# Patient Record
Sex: FEMALE | Race: WHITE
[De-identification: names, ages, dates, MRNs, and addresses within clinical notes are randomized per-mention and may not be internally consistent; named-entity substitution may affect disease eponyms.]

---

## 2018-01-23 ENCOUNTER — HOSPITAL ENCOUNTER (EMERGENCY)
Dept: HOSPITAL 80 - CED | Age: 83
Discharge: HOME | End: 2018-01-23
Payer: COMMERCIAL

## 2018-01-23 VITALS — SYSTOLIC BLOOD PRESSURE: 122 MMHG | HEART RATE: 84 BPM | OXYGEN SATURATION: 96 % | DIASTOLIC BLOOD PRESSURE: 72 MMHG

## 2018-01-23 VITALS — TEMPERATURE: 98.1 F | RESPIRATION RATE: 16 BRPM

## 2018-01-23 DIAGNOSIS — S52.531A: Primary | ICD-10-CM
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
## 2018-01-23 NOTE — EDPHY
H & P


Time Seen by Provider: 01/23/18 09:37


HPI/ROS: 





This patient slipped on ice to at her son's home-sidewalk last night landing on 

outstretched right hand with injury to her right wrist with immediate severe 

pain to the distal radius.  She was watching her grandchildren's at that time 

and applied ice, took ibuprofen and applied a Velcro splint that she had from a 

prior wrist sprain if years ago.  After ibuprofen the pain diminishes to 

moderate intensity.  She had another dose of ibuprofen this morning prior to 

arrival in currently has moderate pain is worse with movement of the wrist.  No 

other exacerbating or alleviating factors.





ROS:  Neuro:  No numbness tingling or other complaints.  She did not strike her 

head and does not have a headache.


Musculoskeletal:  No neck back or other extremity injuries.


Integumentary:  No lacerations or abrasions.


5 point ROS is otherwise negative.





Past Medical/Surgical History: 





Otherwise healthy


Smoking Status: Never smoked


Physical Exam: 





Physical Exam


Vital signs are normal.


General:  No acute distress


HEENT:  Atraumatic.  


Eyes:  Pupils equal and react to light.  Extraocular motions are intact.


Lungs:  No respiratory distress.


Cardiac:  Brisk capillary refill is intact throughout.  Pulses are 2+ and 

symmetric in the affected extremity.


Skin:  No rash or pallor.


Extremities-atraumatic normal except for right wrist


Right wrist:  Patient has swelling and tenderness to the distal radius with 

associated ecchymosis and the step deformity to the dorsum of the radius.  No 

ulnar styloid tenderness.


Neuro:  Alert with no sensorimotor deficits in the affected extremity.





Initial differential diagnosis:  Distal radius fracture, scaphoid fracture, 

wrist sprain, traumatic hematoma


Constitutional: 


 Initial Vital Signs











Temperature (C)  36.7 C   01/23/18 09:41


 


Heart Rate  79   01/23/18 09:41


 


Respiratory Rate  16   01/23/18 09:41


 


Blood Pressure  128/76 H  01/23/18 09:41


 


O2 Sat (%)  94   01/23/18 09:41








 











O2 Delivery Mode               Room Air














Allergies/Adverse Reactions: 


 





No Known Allergies Allergy (Unverified 07/06/12 22:08)


 








Home Medications: 














 Medication  Instructions  Recorded


 


Fluticasone/Salmeterol [Advair 1 each IH 07/06/12





250-50 Diskus]  














MDM/Departure





- MDM


Imaging Results: 


 Imaging Impressions





Wrist X-Ray  01/23/18 09:41


Impression: 


1. Acute minimally impacted and angulated intra-articular distal radius 

fracture.


2. Scapholunate ligament laxity versus tear.


 











Imaging: I viewed and interpreted images myself


ED Course/Re-evaluation: 





We placed a call to Dr. Grier's office, the patient's orthopedic physician who 

treated her prior orthopedic injury.





I discussed the patient's radiographic findings with her-Colles fracture mildly 

impacted with intra-articular component.





SPLINT: Patient is placed in Orthoglass sugar-tong splint by our tech with my 

supervision and she is neurovascularly intact post splint application.





I discussed the case with Dr. Grier, her orthopedic physician who will see her 

in his office tomorrow for further evaluation  tx.





Patient is provided with a disc copy of her wrist radiographs bring to her 

orthopedic physician





Discussion:  Patient with mildly angulated and impacted Colles fracture with 

intra-articular component possible scapholunate ligament injury, 

neurovascularly intact. No evidence of other significant injuries.  She will 

again follow up with Dr. carver understands need to return emergency department 

should she develop any significant worsening of her symptoms despite the 

treatment plan.





- Depart


Disposition: Home, Routine, Self-Care


Clinical Impression: 


Colles' fracture


Qualifiers:


 Encounter type: initial encounter Fracture type: closed Laterality: right 

Qualified Code(s): S52.531A - Colles' fracture of right radius, initial 

encounter for closed fracture





Condition: Good


Instructions:  Wrist Fracture in Adults (ED)


Additional Instructions: 


Diagnosis:  Colles fracture of wrist





Plan:  Ibuprofen and Tylenol for pain control as needed





Keep splint on at all times





Sling when your up and about





Elevate the wrist when you are able





Follow up with Dr. Grier or Dr. Cuellar this week for further evaluation and 

treatment plan.


Referrals: 


Shai Grier MD [Medical Doctor] - As per Instructions


Harlan Cuellar MD [Medical Doctor] - As per Instructions

## 2018-02-06 ENCOUNTER — HOSPITAL ENCOUNTER (EMERGENCY)
Dept: HOSPITAL 80 - CED | Age: 83
Discharge: HOME | End: 2018-02-06
Payer: COMMERCIAL

## 2018-02-06 VITALS — OXYGEN SATURATION: 97 % | RESPIRATION RATE: 18 BRPM | TEMPERATURE: 98.1 F

## 2018-02-06 VITALS — HEART RATE: 80 BPM | DIASTOLIC BLOOD PRESSURE: 80 MMHG | SYSTOLIC BLOOD PRESSURE: 160 MMHG

## 2018-02-06 DIAGNOSIS — S20.212A: Primary | ICD-10-CM

## 2018-02-06 DIAGNOSIS — J45.909: ICD-10-CM
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
## 2018-02-06 NOTE — EDPHY
H & P


Time Seen by Provider: 02/06/18 11:11


HPI/ROS: 





CHIEF COMPLAINT:  Rib pain





HISTORY OF PRESENT ILLNESS:  Patient is an 83-year-old female who presents 

emergency department with left anterior rib pain.  Patient states she fell 2 

weeks ago breaking her right wrist.  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  She leaned up 

onto the edge of the washing machine and felt a sharp pain in her left anterior 

rib.  Since that time has been worse with movement.  It is worse when she takes 

a deep breath.  She has had no shortness of breath. No cough.  No fever.  No 

leg pain or swelling.  No lightheadedness or dizziness.  No other complaints.





REVIEW OF SYSTEMS:  


My complete review of systems is negative except as mentioned in the HPI.


Past Medical/Surgical History: 





Includes asthma





Past surgical history:  Mastectomy





Social history:  Patient does not smoke


Smoking Status: Never smoked


Physical Exam: 





Vitals noted


GENERAL:  Well-appearing, in no acute distress, alert.


HEENT:  Eyes normal to inspection, normal pharynx, no signs of dehydration.


NECK:  [No thyromegaly, no lymphadenopathy, supple.


RESPIRATORY:  Clear to auscultation bilaterally, no rales, rhonchi or wheezing.


Chest wall:  Patient's chest wall appears normal. Her left anterior chest wall 

has mild tenderness palpation with no deformity or crepitus.


CVS:  Regular rate and rhythm, no rubs, murmurs, or gallops.


ABDOMEN:  Soft, nontender, nondistended, no organomegaly.


BACK:  Normal to inspection, no CVA tenderness.


SKIN:  Normal color, no rash, warm, dry.  No pallor.


EXTREMITIES:  No pedal edema, no calf tenderness, no Homans sign or cords, no 

joint swelling. Patient has a cast on her right hand.  This appears intact.


NEURO/PSYCH:  Alert and oriented x3, normal mood and affect, normal motor 

sensory exam.  


Constitutional: 


 Initial Vital Signs











Temperature (C)  36.7 C   02/06/18 10:54


 


Heart Rate  79   02/06/18 10:54


 


Respiratory Rate  18   02/06/18 10:54


 


Blood Pressure  160/84 H  02/06/18 10:54


 


O2 Sat (%)  97   02/06/18 10:54








 











O2 Delivery Mode               Room Air














Allergies/Adverse Reactions: 


 





No Known Allergies Allergy (Verified 02/06/18 10:57)


 








Home Medications: 














 Medication  Instructions  Recorded


 


Fluticasone/Salmeterol [Advair 1 each IH 07/06/12





250-50 Diskus]  














Medical Decision Making


ED Course/Re-evaluation: 





In the emergency department I discussed possible etiologies with the patient.  

I answered all her questions.  A chest x-ray was ordered.





Chest x-ray:  No acute disease noted.





I discussed results with the patient.  I answered all her questions.  She is 

given warnings prior to leaving.


Differential Diagnosis: 





My differential includes but is not limited to rib contusion, rib fracture, 

musculoskeletal strain, pleurisy, pneumonia, bronchitis.  I doubt ACS or acute 

MI.  I doubt pulmonary embolus, dissection or aneurysm.





Departure





- Departure


Disposition: Home, Routine, Self-Care


Clinical Impression: 


Chest wall contusion


Qualifiers:


 Encounter type: initial encounter Laterality: left Qualified Code(s): S20.212A 

- Contusion of left front wall of thorax, initial encounter





Condition: Good


Instructions:  Chest Wall Pain (ED)


Additional Instructions: 


Return with increasing pain, shortness of breath, fever, chills or any other 

concerns.


Referrals: 


Leandro Duran MD [Primary Care Provider] - 3-4 days, if not improved